# Patient Record
Sex: FEMALE | Race: WHITE | Employment: PART TIME | URBAN - METROPOLITAN AREA
[De-identification: names, ages, dates, MRNs, and addresses within clinical notes are randomized per-mention and may not be internally consistent; named-entity substitution may affect disease eponyms.]

---

## 2018-09-13 ENCOUNTER — HOSPITAL ENCOUNTER (EMERGENCY)
Age: 38
Discharge: HOME | End: 2018-09-13
Payer: COMMERCIAL

## 2018-09-13 VITALS
HEART RATE: 88 BPM | DIASTOLIC BLOOD PRESSURE: 85 MMHG | SYSTOLIC BLOOD PRESSURE: 139 MMHG | TEMPERATURE: 98.2 F | OXYGEN SATURATION: 99 % | RESPIRATION RATE: 16 BRPM

## 2018-09-13 DIAGNOSIS — S61.212A LACERATION OF RIGHT MIDDLE FINGER WITHOUT FOREIGN BODY WITHOUT DAMAGE TO NAIL, INITIAL ENCOUNTER: Primary | ICD-10-CM

## 2018-09-13 PROCEDURE — 6370000000 HC RX 637 (ALT 250 FOR IP): Performed by: PHYSICIAN ASSISTANT

## 2018-09-13 RX ORDER — DIAPER,BRIEF,INFANT-TODD,DISP
EACH MISCELLANEOUS ONCE
Status: COMPLETED | OUTPATIENT
Start: 2018-09-13 | End: 2018-09-13

## 2018-09-13 RX ADMIN — BACITRACIN ZINC 1 G: 500 OINTMENT TOPICAL at 19:38

## 2018-09-13 ASSESSMENT — PAIN DESCRIPTION - LOCATION: LOCATION: HAND

## 2018-09-13 ASSESSMENT — PAIN SCALES - GENERAL: PAINLEVEL_OUTOF10: 5

## 2018-09-13 ASSESSMENT — PAIN DESCRIPTION - ORIENTATION: ORIENTATION: RIGHT

## 2018-09-13 NOTE — ED PROVIDER NOTES
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 c/o laceration)       History of Present Illness   Source of history provided by:  Patient. History/Exam Limitations: None. Katarina Marquez is a 45 y.o. female with no significant medical history. She reports that she was at work this afternoon when she went to adjust fan and the blade struck her right 3rd digit suffering 2 small lacerations. Denies any foreign body sensation. Denies any distal paresthesias or weakness. There were no other injuries. She went to an [facility] clinic or her tetanus was updated and was sent here for further evaluation. Is right-hand dominant. ROS    Pertinent positives and negatives are stated within HPI, all other systems reviewed and are negative. Past Surgical History:   Procedure Laterality Date     SECTION  ,     First c-sec due to breech presentation    TONSILLECTOMY AND ADENOIDECTOMY     Social History:  reports that she quit smoking about 12 years ago. She has never used smokeless tobacco. She reports that she does not drink alcohol or use drugs. Family History: family history includes Cancer in her mother; Hypertension in her maternal grandfather and mother. Allergies: Patient has no known allergies. Physical Exam   Oxygen Saturation Interpretation: Normal.   ED Triage Vitals [18 1910]   BP Temp Temp Source Pulse Resp SpO2 Height Weight   139/85 98.2 °F (36.8 °C) Oral 88 16 99 % -- --       Physical Exam  General: Vitals noted, no distress. Afebrile. EENT: Posterior oropharynx unremarkable. Cardiac: Regular, rate, rhythm, no murmur. Pulmonary: Lungs clear bilaterally with good aeration. No adventitious breath sounds. Abdomen: Soft, nonsurgical. Nontender.  No peritoneal signs. Normoactive bowel sounds. Extremities: No peripheral edema. Negative Homans bilaterally, no cords. Neurovascularly intact throughout. Skin: Exam of the right hand shows 2 small, less than 0.5 cm, very superficial lacerations versus avulsions over the volar aspect of the distal phalanx of the 3rd digit. They are very superficial and are not amenable to suture closure. No obvious foreign bodies. Is neurovascularly intact distally. Neuro: No gross neurologic deficits. Lab / Imaging Results   (All laboratory and radiology results have been personally reviewed by myself)  Labs:  No results found for this visit on 09/13/18. Imaging: All Radiology results interpreted by Radiologist unless otherwise noted. No orders to display       ED Course / Medical Decision Making     Medications   bacitracin zinc ointment (not administered)          Consult(s):   None    Procedure(s):   None. Differential Diagnosis: Is extensive but includes superficial/deep laceration, retained foreign body, tendon injury, fracture, dislocation, etc.    MDM:   This is a 45 y.o. female who presents after sustaining a laceration to the 3rd digit of the dominant right hand earlier this afternoon at work. On exam there are 2 very superficial lacerations versus avulsions over the volar aspect of the distal phalanx of the 3rd digit. Are not amenable to suture closure as they appear to be more superficial avulsions so will be left to heal by secondary intent. No evidence of neurovascular or tendinous injury. No obvious foreign body as well. Her tetanus was already updated. Home-going with bacitracin. Discussed wound care. Corporate health follow-up. Counseling: I discussed the differential, results and discharge plan with the patient and/or family/friend/caregiver if present. I emphasized the importance of follow-up with the physician I referred them to in the timeframe recommended.   I explained reasons for the patient to return to the Emergency Department. Additional verbal discharge instructions were also given and discussed with the patient to supplement those generated by the EMR. We also discussed medications that were prescribed (if any) including common side effects and interactions. The patient was advised to abstain from driving, operating heavy machinery or making significant decisions while taking medications such as opiates and muscle relaxers that may impair this. All questions were addressed. They understand return precautions and discharge instructions. The patient and/or family/friend/caregiver expressed understanding. Assessment      1. Laceration of right middle finger without foreign body without damage to nail, initial encounter      Plan   Discharge to home and advised to contact [address] [facility] [address]  [phone]    In 2 days     Patient condition is good    New Medications     New Prescriptions    No medications on file     Electronically signed by CAMILO Kern   DD: 9/13/18  **This report was transcribed using voice recognition software. Every effort was made to ensure accuracy; however, inadvertent computerized transcription errors may be present.   END OF ED PROVIDER NOTE            Nette Patrick [address] [place], [place]  09/13/18 1930